# Patient Record
Sex: MALE | Race: WHITE | NOT HISPANIC OR LATINO | Employment: FULL TIME | URBAN - METROPOLITAN AREA
[De-identification: names, ages, dates, MRNs, and addresses within clinical notes are randomized per-mention and may not be internally consistent; named-entity substitution may affect disease eponyms.]

---

## 2019-04-24 ENCOUNTER — NURSE TRIAGE (OUTPATIENT)
Dept: NURSING | Facility: CLINIC | Age: 24
End: 2019-04-24

## 2019-04-24 ENCOUNTER — APPOINTMENT (OUTPATIENT)
Dept: ULTRASOUND IMAGING | Facility: CLINIC | Age: 24
End: 2019-04-24
Attending: EMERGENCY MEDICINE
Payer: COMMERCIAL

## 2019-04-24 ENCOUNTER — ANESTHESIA (OUTPATIENT)
Dept: SURGERY | Facility: CLINIC | Age: 24
End: 2019-04-24
Payer: COMMERCIAL

## 2019-04-24 ENCOUNTER — HOSPITAL ENCOUNTER (OUTPATIENT)
Facility: CLINIC | Age: 24
Discharge: HOME | End: 2019-04-25
Attending: EMERGENCY MEDICINE | Admitting: UROLOGY
Payer: COMMERCIAL

## 2019-04-24 ENCOUNTER — ANESTHESIA EVENT (OUTPATIENT)
Dept: SURGERY | Facility: CLINIC | Age: 24
End: 2019-04-24
Payer: COMMERCIAL

## 2019-04-24 ENCOUNTER — SURGERY (OUTPATIENT)
Age: 24
End: 2019-04-24
Payer: COMMERCIAL

## 2019-04-24 DIAGNOSIS — S31.30XA RUPTURE OF TESTIS, INITIAL ENCOUNTER: Primary | ICD-10-CM

## 2019-04-24 DIAGNOSIS — N50.1: ICD-10-CM

## 2019-04-24 DIAGNOSIS — S30.22XA CONTUSION OF TESTIS, INITIAL ENCOUNTER: ICD-10-CM

## 2019-04-24 LAB
ANION GAP SERPL CALCULATED.3IONS-SCNC: 8 MMOL/L (ref 3–14)
BUN SERPL-MCNC: 20 MG/DL (ref 7–30)
CALCIUM SERPL-MCNC: 9.3 MG/DL (ref 8.5–10.1)
CHLORIDE SERPL-SCNC: 103 MMOL/L (ref 94–109)
CO2 SERPL-SCNC: 28 MMOL/L (ref 20–32)
CREAT SERPL-MCNC: 0.96 MG/DL (ref 0.66–1.25)
ERYTHROCYTE [DISTWIDTH] IN BLOOD BY AUTOMATED COUNT: 12.9 % (ref 10–15)
GFR SERPL CREATININE-BSD FRML MDRD: >90 ML/MIN/{1.73_M2}
GLUCOSE SERPL-MCNC: 94 MG/DL (ref 70–99)
HCT VFR BLD AUTO: 43.3 % (ref 40–53)
HGB BLD-MCNC: 14.6 G/DL (ref 13.3–17.7)
MCH RBC QN AUTO: 28.9 PG (ref 26.5–33)
MCHC RBC AUTO-ENTMCNC: 33.7 G/DL (ref 31.5–36.5)
MCV RBC AUTO: 86 FL (ref 78–100)
PLATELET # BLD AUTO: 180 10E9/L (ref 150–450)
POTASSIUM SERPL-SCNC: 4.1 MMOL/L (ref 3.4–5.3)
RBC # BLD AUTO: 5.06 10E12/L (ref 4.4–5.9)
SODIUM SERPL-SCNC: 139 MMOL/L (ref 133–144)
WBC # BLD AUTO: 7.2 10E9/L (ref 4–11)

## 2019-04-24 PROCEDURE — 40000936 ZZH STATISTIC OUTPATIENT (NON-OBS) NIGHT

## 2019-04-24 PROCEDURE — 93976 VASCULAR STUDY: CPT

## 2019-04-24 PROCEDURE — 25000125 ZZHC RX 250: Performed by: NURSE ANESTHETIST, CERTIFIED REGISTERED

## 2019-04-24 PROCEDURE — 25800030 ZZH RX IP 258 OP 636: Performed by: ANESTHESIOLOGY

## 2019-04-24 PROCEDURE — 25000128 H RX IP 250 OP 636: Performed by: UROLOGY

## 2019-04-24 PROCEDURE — 25000128 H RX IP 250 OP 636: Performed by: NURSE ANESTHETIST, CERTIFIED REGISTERED

## 2019-04-24 PROCEDURE — 37000008 ZZH ANESTHESIA TECHNICAL FEE, 1ST 30 MIN: Performed by: UROLOGY

## 2019-04-24 PROCEDURE — 25000125 ZZHC RX 250: Performed by: UROLOGY

## 2019-04-24 PROCEDURE — 88307 TISSUE EXAM BY PATHOLOGIST: CPT | Performed by: UROLOGY

## 2019-04-24 PROCEDURE — 25000566 ZZH SEVOFLURANE, EA 15 MIN: Performed by: UROLOGY

## 2019-04-24 PROCEDURE — 25000132 ZZH RX MED GY IP 250 OP 250 PS 637: Performed by: EMERGENCY MEDICINE

## 2019-04-24 PROCEDURE — 71000012 ZZH RECOVERY PHASE 1 LEVEL 1 FIRST HR: Performed by: UROLOGY

## 2019-04-24 PROCEDURE — 27210794 ZZH OR GENERAL SUPPLY STERILE: Performed by: UROLOGY

## 2019-04-24 PROCEDURE — 36000050 ZZH SURGERY LEVEL 2 1ST 30 MIN: Performed by: UROLOGY

## 2019-04-24 PROCEDURE — 37000009 ZZH ANESTHESIA TECHNICAL FEE, EACH ADDTL 15 MIN: Performed by: UROLOGY

## 2019-04-24 PROCEDURE — 80048 BASIC METABOLIC PNL TOTAL CA: CPT | Performed by: ANESTHESIOLOGY

## 2019-04-24 PROCEDURE — 85027 COMPLETE CBC AUTOMATED: CPT | Performed by: ANESTHESIOLOGY

## 2019-04-24 PROCEDURE — 36000052 ZZH SURGERY LEVEL 2 EA 15 ADDTL MIN: Performed by: UROLOGY

## 2019-04-24 PROCEDURE — 99285 EMERGENCY DEPT VISIT HI MDM: CPT | Mod: 25

## 2019-04-24 PROCEDURE — 40000170 ZZH STATISTIC PRE-PROCEDURE ASSESSMENT II: Performed by: UROLOGY

## 2019-04-24 RX ORDER — CEPHALEXIN 500 MG/1
500 CAPSULE ORAL EVERY 6 HOURS
Status: DISCONTINUED | OUTPATIENT
Start: 2019-04-25 | End: 2019-04-25

## 2019-04-24 RX ORDER — PROPOFOL 10 MG/ML
INJECTION, EMULSION INTRAVENOUS CONTINUOUS PRN
Status: DISCONTINUED | OUTPATIENT
Start: 2019-04-24 | End: 2019-04-25

## 2019-04-24 RX ORDER — PROPOFOL 10 MG/ML
INJECTION, EMULSION INTRAVENOUS PRN
Status: DISCONTINUED | OUTPATIENT
Start: 2019-04-24 | End: 2019-04-25

## 2019-04-24 RX ORDER — LIDOCAINE HYDROCHLORIDE 20 MG/ML
INJECTION, SOLUTION INFILTRATION; PERINEURAL PRN
Status: DISCONTINUED | OUTPATIENT
Start: 2019-04-24 | End: 2019-04-25

## 2019-04-24 RX ORDER — NALOXONE HYDROCHLORIDE 0.4 MG/ML
.1-.4 INJECTION, SOLUTION INTRAMUSCULAR; INTRAVENOUS; SUBCUTANEOUS
Status: DISCONTINUED | OUTPATIENT
Start: 2019-04-24 | End: 2019-04-25 | Stop reason: HOSPADM

## 2019-04-24 RX ORDER — CEFAZOLIN SODIUM 2 G/100ML
2 INJECTION, SOLUTION INTRAVENOUS
Status: COMPLETED | OUTPATIENT
Start: 2019-04-24 | End: 2019-04-24

## 2019-04-24 RX ORDER — MAGNESIUM HYDROXIDE 1200 MG/15ML
LIQUID ORAL PRN
Status: DISCONTINUED | OUTPATIENT
Start: 2019-04-24 | End: 2019-04-25 | Stop reason: HOSPADM

## 2019-04-24 RX ORDER — DEXAMETHASONE SODIUM PHOSPHATE 4 MG/ML
INJECTION, SOLUTION INTRA-ARTICULAR; INTRALESIONAL; INTRAMUSCULAR; INTRAVENOUS; SOFT TISSUE PRN
Status: DISCONTINUED | OUTPATIENT
Start: 2019-04-24 | End: 2019-04-25

## 2019-04-24 RX ORDER — MORPHINE SULFATE 4 MG/ML
4 INJECTION, SOLUTION INTRAMUSCULAR; INTRAVENOUS
Status: DISCONTINUED | OUTPATIENT
Start: 2019-04-24 | End: 2019-04-24 | Stop reason: CLARIF

## 2019-04-24 RX ORDER — EPHEDRINE SULFATE 50 MG/ML
INJECTION, SOLUTION INTRAMUSCULAR; INTRAVENOUS; SUBCUTANEOUS PRN
Status: DISCONTINUED | OUTPATIENT
Start: 2019-04-24 | End: 2019-04-25

## 2019-04-24 RX ORDER — BUPIVACAINE HYDROCHLORIDE AND EPINEPHRINE 5; 5 MG/ML; UG/ML
INJECTION, SOLUTION PERINEURAL PRN
Status: DISCONTINUED | OUTPATIENT
Start: 2019-04-24 | End: 2019-04-25 | Stop reason: HOSPADM

## 2019-04-24 RX ORDER — FENTANYL CITRATE 50 UG/ML
INJECTION, SOLUTION INTRAMUSCULAR; INTRAVENOUS PRN
Status: DISCONTINUED | OUTPATIENT
Start: 2019-04-24 | End: 2019-04-25

## 2019-04-24 RX ORDER — GLYCOPYRROLATE 0.2 MG/ML
INJECTION, SOLUTION INTRAMUSCULAR; INTRAVENOUS PRN
Status: DISCONTINUED | OUTPATIENT
Start: 2019-04-24 | End: 2019-04-25

## 2019-04-24 RX ORDER — ONDANSETRON 4 MG/1
4 TABLET, ORALLY DISINTEGRATING ORAL EVERY 30 MIN PRN
Status: DISCONTINUED | OUTPATIENT
Start: 2019-04-24 | End: 2019-04-25 | Stop reason: HOSPADM

## 2019-04-24 RX ORDER — SODIUM CHLORIDE, SODIUM LACTATE, POTASSIUM CHLORIDE, CALCIUM CHLORIDE 600; 310; 30; 20 MG/100ML; MG/100ML; MG/100ML; MG/100ML
INJECTION, SOLUTION INTRAVENOUS CONTINUOUS
Status: DISCONTINUED | OUTPATIENT
Start: 2019-04-24 | End: 2019-04-25 | Stop reason: HOSPADM

## 2019-04-24 RX ORDER — ONDANSETRON 2 MG/ML
4 INJECTION INTRAMUSCULAR; INTRAVENOUS EVERY 30 MIN PRN
Status: DISCONTINUED | OUTPATIENT
Start: 2019-04-24 | End: 2019-04-25 | Stop reason: HOSPADM

## 2019-04-24 RX ORDER — IBUPROFEN 400 MG/1
800 TABLET, FILM COATED ORAL ONCE
Status: COMPLETED | OUTPATIENT
Start: 2019-04-24 | End: 2019-04-24

## 2019-04-24 RX ORDER — CEFAZOLIN SODIUM 1 G/3ML
1 INJECTION, POWDER, FOR SOLUTION INTRAMUSCULAR; INTRAVENOUS ONCE
Status: DISCONTINUED | OUTPATIENT
Start: 2019-04-24 | End: 2019-04-25

## 2019-04-24 RX ORDER — FENTANYL CITRATE 50 UG/ML
25-50 INJECTION, SOLUTION INTRAMUSCULAR; INTRAVENOUS
Status: DISCONTINUED | OUTPATIENT
Start: 2019-04-24 | End: 2019-04-25 | Stop reason: HOSPADM

## 2019-04-24 RX ORDER — HYDROCODONE BITARTRATE AND ACETAMINOPHEN 5; 325 MG/1; MG/1
1 TABLET ORAL EVERY 6 HOURS PRN
Status: DISCONTINUED | OUTPATIENT
Start: 2019-04-24 | End: 2019-04-25 | Stop reason: HOSPADM

## 2019-04-24 RX ORDER — ONDANSETRON 2 MG/ML
INJECTION INTRAMUSCULAR; INTRAVENOUS PRN
Status: DISCONTINUED | OUTPATIENT
Start: 2019-04-24 | End: 2019-04-25

## 2019-04-24 RX ORDER — SODIUM CHLORIDE 9 MG/ML
INJECTION, SOLUTION INTRAVENOUS CONTINUOUS
Status: DISCONTINUED | OUTPATIENT
Start: 2019-04-24 | End: 2019-04-25 | Stop reason: HOSPADM

## 2019-04-24 RX ADMIN — BUPIVACAINE HYDROCHLORIDE AND EPINEPHRINE BITARTRATE 15 ML: 5; .005 INJECTION, SOLUTION PERINEURAL at 23:48

## 2019-04-24 RX ADMIN — FENTANYL CITRATE 100 MCG: 50 INJECTION, SOLUTION INTRAMUSCULAR; INTRAVENOUS at 23:03

## 2019-04-24 RX ADMIN — PROPOFOL 200 MG: 10 INJECTION, EMULSION INTRAVENOUS at 23:04

## 2019-04-24 RX ADMIN — SODIUM CHLORIDE 1000 ML: 900 IRRIGANT IRRIGATION at 23:15

## 2019-04-24 RX ADMIN — Medication 10 MG: at 23:39

## 2019-04-24 RX ADMIN — IBUPROFEN 800 MG: 400 TABLET ORAL at 20:27

## 2019-04-24 RX ADMIN — DEXAMETHASONE SODIUM PHOSPHATE 4 MG: 4 INJECTION, SOLUTION INTRA-ARTICULAR; INTRALESIONAL; INTRAMUSCULAR; INTRAVENOUS; SOFT TISSUE at 23:08

## 2019-04-24 RX ADMIN — Medication 5 MG: at 23:13

## 2019-04-24 RX ADMIN — MIDAZOLAM 2 MG: 1 INJECTION INTRAMUSCULAR; INTRAVENOUS at 22:58

## 2019-04-24 RX ADMIN — SODIUM CHLORIDE, POTASSIUM CHLORIDE, SODIUM LACTATE AND CALCIUM CHLORIDE: 600; 310; 30; 20 INJECTION, SOLUTION INTRAVENOUS at 22:55

## 2019-04-24 RX ADMIN — Medication 5 MG: at 23:29

## 2019-04-24 RX ADMIN — LIDOCAINE HYDROCHLORIDE 60 MG: 20 INJECTION, SOLUTION INFILTRATION; PERINEURAL at 23:03

## 2019-04-24 RX ADMIN — Medication 5 MG: at 23:26

## 2019-04-24 RX ADMIN — SUCCINYLCHOLINE CHLORIDE 130 MG: 20 INJECTION, SOLUTION INTRAMUSCULAR; INTRAVENOUS; PARENTERAL at 23:04

## 2019-04-24 RX ADMIN — FENTANYL CITRATE 50 MCG: 50 INJECTION, SOLUTION INTRAMUSCULAR; INTRAVENOUS at 23:14

## 2019-04-24 RX ADMIN — PROPOFOL 30 MCG/KG/MIN: 10 INJECTION, EMULSION INTRAVENOUS at 23:15

## 2019-04-24 RX ADMIN — CEFAZOLIN SODIUM 2 G: 2 INJECTION, SOLUTION INTRAVENOUS at 23:10

## 2019-04-24 RX ADMIN — Medication 10 MG: at 23:11

## 2019-04-24 RX ADMIN — GLYCOPYRROLATE 0.1 MG: 0.2 INJECTION, SOLUTION INTRAMUSCULAR; INTRAVENOUS at 23:30

## 2019-04-24 RX ADMIN — GLYCOPYRROLATE 0.1 MG: 0.2 INJECTION, SOLUTION INTRAMUSCULAR; INTRAVENOUS at 23:39

## 2019-04-24 RX ADMIN — SODIUM CHLORIDE, POTASSIUM CHLORIDE, SODIUM LACTATE AND CALCIUM CHLORIDE: 600; 310; 30; 20 INJECTION, SOLUTION INTRAVENOUS at 23:43

## 2019-04-24 RX ADMIN — ONDANSETRON 4 MG: 2 INJECTION INTRAMUSCULAR; INTRAVENOUS at 23:27

## 2019-04-24 SDOH — HEALTH STABILITY: MENTAL HEALTH: HOW OFTEN DO YOU HAVE A DRINK CONTAINING ALCOHOL?: NEVER

## 2019-04-24 ASSESSMENT — ENCOUNTER SYMPTOMS
HEMATURIA: 0
VOMITING: 0
NAUSEA: 0
FREQUENCY: 0
ABDOMINAL PAIN: 0
DIFFICULTY URINATING: 0
DYSURIA: 0

## 2019-04-24 ASSESSMENT — MIFFLIN-ST. JEOR: SCORE: 1706.14

## 2019-04-25 VITALS
RESPIRATION RATE: 15 BRPM | BODY MASS INDEX: 24.44 KG/M2 | HEIGHT: 69 IN | WEIGHT: 165 LBS | OXYGEN SATURATION: 97 % | HEART RATE: 61 BPM | SYSTOLIC BLOOD PRESSURE: 100 MMHG | TEMPERATURE: 96.6 F | DIASTOLIC BLOOD PRESSURE: 46 MMHG

## 2019-04-25 PROBLEM — S31.30XA: Status: ACTIVE | Noted: 2019-04-25

## 2019-04-25 LAB — GLUCOSE BLDC GLUCOMTR-MCNC: 104 MG/DL (ref 70–99)

## 2019-04-25 PROCEDURE — 82962 GLUCOSE BLOOD TEST: CPT

## 2019-04-25 PROCEDURE — 25000125 ZZHC RX 250: Performed by: UROLOGY

## 2019-04-25 PROCEDURE — 40000934 ZZH STATISTIC OUTPATIENT (NON-OBS) DAY

## 2019-04-25 PROCEDURE — 25800030 ZZH RX IP 258 OP 636: Performed by: EMERGENCY MEDICINE

## 2019-04-25 PROCEDURE — 25000132 ZZH RX MED GY IP 250 OP 250 PS 637: Performed by: UROLOGY

## 2019-04-25 RX ORDER — NALOXONE HYDROCHLORIDE 0.4 MG/ML
.1-.4 INJECTION, SOLUTION INTRAMUSCULAR; INTRAVENOUS; SUBCUTANEOUS
Status: DISCONTINUED | OUTPATIENT
Start: 2019-04-25 | End: 2019-04-25

## 2019-04-25 RX ORDER — ONDANSETRON 2 MG/ML
4 INJECTION INTRAMUSCULAR; INTRAVENOUS EVERY 30 MIN PRN
Status: DISCONTINUED | OUTPATIENT
Start: 2019-04-25 | End: 2019-04-25 | Stop reason: HOSPADM

## 2019-04-25 RX ORDER — CEPHALEXIN 500 MG/1
500 CAPSULE ORAL EVERY 8 HOURS
Status: DISCONTINUED | OUTPATIENT
Start: 2019-04-25 | End: 2019-04-25 | Stop reason: HOSPADM

## 2019-04-25 RX ORDER — SODIUM CHLORIDE, SODIUM LACTATE, POTASSIUM CHLORIDE, CALCIUM CHLORIDE 600; 310; 30; 20 MG/100ML; MG/100ML; MG/100ML; MG/100ML
INJECTION, SOLUTION INTRAVENOUS CONTINUOUS
Status: DISCONTINUED | OUTPATIENT
Start: 2019-04-25 | End: 2019-04-25 | Stop reason: HOSPADM

## 2019-04-25 RX ORDER — CLONIDINE HYDROCHLORIDE 0.1 MG/1
0.1 TABLET ORAL 2 TIMES DAILY
Status: DISCONTINUED | OUTPATIENT
Start: 2019-04-25 | End: 2019-04-25 | Stop reason: HOSPADM

## 2019-04-25 RX ORDER — ONDANSETRON 4 MG/1
4 TABLET, ORALLY DISINTEGRATING ORAL EVERY 30 MIN PRN
Status: DISCONTINUED | OUTPATIENT
Start: 2019-04-25 | End: 2019-04-25 | Stop reason: HOSPADM

## 2019-04-25 RX ORDER — ONDANSETRON 2 MG/ML
4 INJECTION INTRAMUSCULAR; INTRAVENOUS EVERY 6 HOURS PRN
Status: DISCONTINUED | OUTPATIENT
Start: 2019-04-25 | End: 2019-04-25 | Stop reason: HOSPADM

## 2019-04-25 RX ORDER — CEPHALEXIN 500 MG/1
500 CAPSULE ORAL EVERY 8 HOURS
Qty: 9 CAPSULE | Refills: 0 | Status: SHIPPED | OUTPATIENT
Start: 2019-04-25 | End: 2019-04-28

## 2019-04-25 RX ORDER — BACITRACIN ZINC 500 [USP'U]/G
OINTMENT TOPICAL PRN
Status: DISCONTINUED | OUTPATIENT
Start: 2019-04-25 | End: 2019-04-25 | Stop reason: HOSPADM

## 2019-04-25 RX ORDER — FENTANYL CITRATE 50 UG/ML
25-50 INJECTION, SOLUTION INTRAMUSCULAR; INTRAVENOUS
Status: DISCONTINUED | OUTPATIENT
Start: 2019-04-25 | End: 2019-04-25 | Stop reason: HOSPADM

## 2019-04-25 RX ORDER — ONDANSETRON 4 MG/1
4 TABLET, ORALLY DISINTEGRATING ORAL EVERY 6 HOURS PRN
Status: DISCONTINUED | OUTPATIENT
Start: 2019-04-25 | End: 2019-04-25 | Stop reason: HOSPADM

## 2019-04-25 RX ORDER — HYDROCODONE BITARTRATE AND ACETAMINOPHEN 5; 325 MG/1; MG/1
1-2 TABLET ORAL EVERY 4 HOURS PRN
Qty: 20 TABLET | Refills: 0 | Status: SHIPPED | OUTPATIENT
Start: 2019-04-25 | End: 2019-05-15

## 2019-04-25 RX ORDER — HYDROMORPHONE HYDROCHLORIDE 1 MG/ML
0.2 INJECTION, SOLUTION INTRAMUSCULAR; INTRAVENOUS; SUBCUTANEOUS
Status: DISCONTINUED | OUTPATIENT
Start: 2019-04-25 | End: 2019-04-25 | Stop reason: HOSPADM

## 2019-04-25 RX ORDER — LIDOCAINE 40 MG/G
CREAM TOPICAL
Status: DISCONTINUED | OUTPATIENT
Start: 2019-04-25 | End: 2019-04-25 | Stop reason: HOSPADM

## 2019-04-25 RX ORDER — HYDROMORPHONE HYDROCHLORIDE 1 MG/ML
.3-.5 INJECTION, SOLUTION INTRAMUSCULAR; INTRAVENOUS; SUBCUTANEOUS EVERY 5 MIN PRN
Status: DISCONTINUED | OUTPATIENT
Start: 2019-04-25 | End: 2019-04-25 | Stop reason: HOSPADM

## 2019-04-25 RX ADMIN — HYDROCODONE BITARTRATE AND ACETAMINOPHEN 1 TABLET: 5; 325 TABLET ORAL at 08:19

## 2019-04-25 RX ADMIN — SODIUM CHLORIDE: 9 INJECTION, SOLUTION INTRAVENOUS at 03:04

## 2019-04-25 RX ADMIN — CEPHALEXIN 500 MG: 500 CAPSULE ORAL at 08:19

## 2019-04-25 RX ADMIN — BACITRACIN ZINC 14 G: 500 OINTMENT TOPICAL at 00:10

## 2019-04-25 ASSESSMENT — MIFFLIN-ST. JEOR: SCORE: 1728.82

## 2019-04-25 NOTE — OR NURSING
Telephone report was given to [facility] RN.  Patient will be transported to Room. 17 via cart accompanied by RN.  Belongings: 1 hospital bag. Family : in WR

## 2019-04-25 NOTE — PLAN OF CARE
A&Ox4. VSS on RA. Up independently. Voiding adequately w/o difficulty. Pain controlled w/ Norco. Site cleansed w/ saline, dressing changed, jock bra on. Tammie reg diet. Discharge instructions and medications reviewed, pt and mother verbalize no further questions/concerns. Pt to follow up w/ Joe's office 5/10, pt aware. Pt to discharge home w/ mother
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
## 2019-04-25 NOTE — PHARMACY-ADMISSION MEDICATION HISTORY
Admission medication history interview status for the 4/24/2019  admission is complete. See EPIC admission navigator for prior to admission medications     Medication history source reliability:Good    Actions taken by pharmacist (provider contacted, etc): Left sticky note informing MD patient no longer takes Clonidine. Refused dose this am.     Additional medication history information not noted on PTA med list : Patient uses some topical medications for acne.    Medication reconciliation/reorder completed by provider prior to medication history? Yes    Time spent in this activity: 10 minutes    Prior to Admission medications    Medication Sig Last Dose Taking? Auth Provider   cephALEXin (KEFLEX) 500 MG capsule Take 1 capsule (500 mg) by mouth every 8 hours for 3 days  Yes Maya Trevino PA-C   HYDROcodone-acetaminophen (NORCO) 5-325 MG tablet Take 1-2 tablets by mouth every 4 hours as needed (Moderate to Severe Pain)  Yes Armani Diaz MD
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
## 2019-04-25 NOTE — PROVIDER NOTIFICATION
MD Notification    Notified Person: MD    Notified Person Name: Joe    Notification Date/Time: 1300    Notification Interaction: telephone     Purpose of Notification: Pt has met all post op goals. Is he ok to discharge?    Orders Received: Yes, pt ok to discharge. Please review discharge instructions w/ pt carefully regarding lifting    Comments:

## 2019-04-25 NOTE — PROVIDER NOTIFICATION
MD Notification    Notified Person: MD    Notified Person Name: Dr. Diaz    Notification Date/Time: 4/25/19 1410    Notification Interaction: Web page    Purpose of Notification: Patient to discharge - will they need to be seen before they can leave.     Orders Received:    Comments:

## 2019-04-25 NOTE — ANESTHESIA CARE TRANSFER NOTE
Patient: Harry eGorge    Procedure(s):  SCROTAL EXPLORATION, ORCHIOPEXY, AND REPAIR OF TESTICULAR FRACTURE  Orchiopexy    Diagnosis: unknown  Diagnosis Additional Information: No value filed.    Anesthesia Type:   No value filed.     Note:  Airway :Face Mask  Patient transferred to:PACU  Comments: Neuromuscular blockade not used after succinylcholine for intubation, spontaneous return of TOF 4/4 with sustained tetany, spontaneous respirations, adequate tidal volumes, followed commands to voice, oropharynx suctioned with soft flexible catheter, extubated atraumatically, extubated with suction, airway patent after extubation.  Oxygen via facemask at 10 liters per minute to PACU. Oxygen tubing connected to wall O2 in PACU, SpO2, NiBP, and EKG monitors and alarms on and functioning, Maco Hugger warmer connected to patient gown, report on patient's clinical status given to PACU RN, RN questions answered. Handoff Report: Identifed the Patient, Identified the Reponsible Provider, Reviewed the pertinent medical history, Discussed the surgical course, Reviewed Intra-OP anesthesia mangement and issues during anesthesia, Set expectations for post-procedure period and Allowed opportunity for questions and acknowledgement of understanding      Vitals: (Last set prior to Anesthesia Care Transfer)    CRNA VITALS  4/24/2019 2336 - 4/25/2019 0010      4/25/2019             NIBP:  121/59    Pulse:  92    NIBP Mean:  87    Ht Rate:  92    SpO2:  97 %    Resp Rate (observed):  4  (Abnormal)     Resp Rate (set):  10                Electronically Signed By: SINDY Calix CRNA  April 25, 2019  12:10 AM

## 2019-04-25 NOTE — CONSULTS
"UROLOGY CONSULTATION:    PATIENT: Harry SCHERER (1995)  AGE: 23 year old year old male    Primary Care Provider / Referring Physician:  Clinic, [facility]    CHIEF COMPLAINT:  Contusion of testis, initial encounter  Hemorrhage of testis    HPI:   nEW ONSET OF right testicular trauma with wrestling around 6 pm tonight; no nause, no vomiting, no voiding symptoms but he has a lot of scrotal swelling.  Scrotal US suggested testicular fracture.    Past Medical History:   Diagnosis Date     Autism      Pain in joint, ankle and foot 10/13/2008     History reviewed. No pertinent surgical history.  PAST SOCIAL HISTORY  Social History     Social History Narrative     Not on file     Social History     Tobacco Use     Smoking status: Never Smoker     Smokeless tobacco: Never Used   Substance Use Topics     Alcohol use: Never     Frequency: Never     Review of Systems  @Abcamos@             No current outpatient medications on file.              No Known Allergies    PHYSICAL EXAM:          /58   Pulse 61   Temp 98.2  F (36.8  C) (Oral)   Resp 18   Ht 1.753 m (5' 9\")   Wt 72.6 kg (160 lb)   SpO2 100%   BMI 23.63 kg/m           General appearance shows no deformaties and good grooming.  Chest: clear  Abdomen: soft, no masses  : right testicle is tender; difficult to assess the edges; cannot rule out lineal deformity, tender to palpation; twice the size of the normal size        LABS:   No results found for: PSA  UA RESULTS:  No results for input(s): COLOR, APPEARANCE, URINEGLC, URINEBILI, URINEKETONE, SG, UBLD, URINEPH, PROTEIN, UROBILINOGEN, NITRITE, LEUKEST, RBCU, WBCU in the last 32675 hours.  No results found for: CR]  No results found for: HGB]      Right Scrotum:       Testis: Right testis is slightly lobulated in contour, there is  intratesticular flow which is decreased at the upper pole and normal  in appearance in the lower pole. There are complex echoes in the " right  scrotal sac suspicious for complex fluid or blood. There is a linear  area of decreased echogenicity in the posterior mid testis raising the  question of a partial laceration of the right testis.       Epididymis: 1.5 cm epididymal head cyst.       No varicocele.     Left Scrotum       Testis: Normal, 4.7 x 2.5 x 2.1 cm.       Epididymis: Normal.        Small left hydrocele. No varicocele.                                                                       IMPRESSION: Complex fluid in the right scrotal sac consistent with  hemorrhage. There is concern for a tiny possible laceration in the  right testicle and upper pole right testis is slightly enlarged,  hypoechoic with decreased blood flow. Results discussed by myself with  Dr. Levy at the time of the exam.      ASSESSMENT:   1. Contusion of testis, initial encounter    2. Hemorrhage of testis          PLAN:    Concern for testicular fracture with hemorrhage into the testicle.   I consulted for scrotal exploration with risk of orchiectomy/decreased fertility/scarring and atrophy  All questions were answered.     Armani Diaz

## 2019-04-25 NOTE — OR NURSING
Patient Harry George is in stable condition and has met criteria for PACU discharge.  MDA Buna was  informed and a sign out was given for Unit/Station transfer.

## 2019-04-25 NOTE — ANESTHESIA POSTPROCEDURE EVALUATION
Patient: Harry George    Procedure(s):  SCROTAL EXPLORATION, ORCHIOPEXY, AND REPAIR OF TESTICULAR FRACTURE  Orchiopexy    Diagnosis:unknown  Diagnosis Additional Information: No value filed.    Anesthesia Type:  General, RSI, ETT    Note:  Anesthesia Post Evaluation    Patient location during evaluation: PACU  Patient participation: Able to fully participate in evaluation  Level of consciousness: awake  Pain management: adequate  Airway patency: patent  Cardiovascular status: acceptable  Respiratory status: acceptable  Hydration status: acceptable  PONV: none     Anesthetic complications: None          Last vitals:  Vitals:    04/24/19 2222 04/25/19 0010 04/25/19 0020   BP: 126/58 115/65 117/60   Pulse: 61 57 56   Resp: 18 17 18   Temp:  36.5  C (97.7  F) 36.5  C (97.7  F)   SpO2: 100% 100% 98%         Electronically Signed By: Parminder Yates MD  April 25, 2019  12:29 AM

## 2019-04-25 NOTE — PLAN OF CARE
Pt is admission of the shift, admitted from PACU following repair of testicular fracture. He is A&OX4, VSS on RA, denies pain. Dressing C/D/I, scrotum support in place.Up with SBA, voiding o.k(1300cc this morning at once).IVF Nacl cont@200.Family in room.

## 2019-04-25 NOTE — ANESTHESIA PREPROCEDURE EVALUATION
Anesthesia Pre-Procedure Evaluation    Patient: Harry George   MRN: [id number] : 1995          Preoperative Diagnosis: unknown    Procedure(s):  SCROTAL EXPLORATION, ORCHIOPEXY, AND REPAIR OF TESTICULAR FRACTURE  Orchiopexy    Past Medical History:   Diagnosis Date     Autism      Pain in joint, ankle and foot 10/13/2008     History reviewed. No pertinent surgical history.    Anesthesia Evaluation     . Pt has had prior anesthetic.     No history of anesthetic complications          ROS/MED HX    ENT/Pulmonary:  - neg pulmonary ROS     Neurologic:       Cardiovascular:  - neg cardiovascular ROS       METS/Exercise Tolerance:  >4 METS   Hematologic:         Musculoskeletal:         GI/Hepatic:  - neg GI/hepatic ROS       Renal/Genitourinary: Comment: Acute testicular rupture vs torsion        Endo:         Psychiatric:     (+) psychiatric history Psychiatric Hx List: ADHD.      Infectious Disease:         Malignancy:         Other: Comment: Autism                         Physical Exam  Normal systems: dental    Airway   Mallampati: I  TM distance: >3 FB  Neck ROM: full    Dental     Cardiovascular   Rhythm and rate: regular and normal      Pulmonary    breath sounds clear to auscultation            Lab Results   Component Value Date    WBC 7.2 2019    HGB 14.6 2019    HCT 43.3 2019     2019     2019    POTASSIUM 4.1 2019    CHLORIDE 103 2019    CO2 28 2019    BUN 20 2019    CR 0.96 2019    GLC 94 2019    ANASTASIYA 9.3 2019       Preop Vitals  BP Readings from Last 3 Encounters:   19 117/60   11 97/54 (3 %/ 9 %)*     *BP percentiles are based on the 2017 AAP Clinical Practice Guideline for boys    Pulse Readings from Last 3 Encounters:   19 56      Resp Readings from Last 3 Encounters:   19 18   11 16    SpO2 Readings from Last 3 Encounters:   19 98%   11 96%      Temp Readings  "from Last 1 Encounters:   04/25/19 36.5  C (97.7  F)    Ht Readings from Last 1 Encounters:   04/24/19 1.753 m (5' 9\")      Wt Readings from Last 1 Encounters:   04/24/19 72.6 kg (160 lb)    Estimated body mass index is 23.63 kg/m  as calculated from the following:    Height as of this encounter: 1.753 m (5' 9\").    Weight as of this encounter: 72.6 kg (160 lb).       Anesthesia Plan      History & Physical Review  History and physical reviewed and following examination; no interval change.    ASA Status:  2 .    NPO Status:  Full stomach and waived due to emergency    Plan for General, RSI and ETT with Intravenous and Propofol induction. Maintenance will be Balanced.    PONV prophylaxis:  Ondansetron (or other 5HT-3) and Dexamethasone or Solumedrol       Postoperative Care  Postoperative pain management:  IV analgesics and Oral pain medications.      Consents  Anesthetic plan, risks, benefits and alternatives discussed with:  Patient and Parent (Mother and/or Father).  Use of blood products discussed: Yes.   Use of blood products discussed with Patient.  Consented to blood products.  .                 Parminder Yates MD  "

## 2019-04-25 NOTE — BRIEF OP NOTE
[facility] Brief Operative Note    Pre-operative diagnosis: Testicular injury   Post-operative diagnosis Testicular fracture    Procedure: Procedure(s):  SCROTAL EXPLORATION, ORCHIOPEXY, AND REPAIR OF TESTICULAR FRACTURE  Orchiopexy   Surgeon: Armani Diaz MD   Assistants(s): none   Estimated blood loss: Less than 10 ml    Specimens: Testicular tissue with hematoma   Findings: Fractured testicle 3 cm in size; hematoma

## 2019-04-26 LAB — COPATH REPORT: NORMAL
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
## 2022-03-31 ENCOUNTER — HOSPITAL ENCOUNTER (EMERGENCY)
Facility: CLINIC | Age: 27
Discharge: HOME | End: 2022-03-31
Attending: PHYSICIAN ASSISTANT | Admitting: PHYSICIAN ASSISTANT
Payer: COMMERCIAL

## 2022-03-31 VITALS
RESPIRATION RATE: 18 BRPM | OXYGEN SATURATION: 99 % | DIASTOLIC BLOOD PRESSURE: 48 MMHG | WEIGHT: 165 LBS | HEIGHT: 69 IN | SYSTOLIC BLOOD PRESSURE: 136 MMHG | TEMPERATURE: 98.7 F | HEART RATE: 61 BPM | BODY MASS INDEX: 24.44 KG/M2

## 2022-03-31 DIAGNOSIS — S01.81XA FACIAL LACERATION, INITIAL ENCOUNTER: ICD-10-CM
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
## 2022-03-31 PROCEDURE — 90471 IMMUNIZATION ADMIN: CPT | Performed by: PHYSICIAN ASSISTANT

## 2022-03-31 PROCEDURE — 12011 RPR F/E/E/N/L/M 2.5 CM/<: CPT

## 2022-03-31 PROCEDURE — 99283 EMERGENCY DEPT VISIT LOW MDM: CPT | Mod: 25

## 2022-03-31 PROCEDURE — 250N000011 HC RX IP 250 OP 636: Performed by: PHYSICIAN ASSISTANT

## 2022-03-31 PROCEDURE — 250N000009 HC RX 250

## 2022-03-31 PROCEDURE — 90715 TDAP VACCINE 7 YRS/> IM: CPT | Performed by: PHYSICIAN ASSISTANT

## 2022-03-31 RX ADMIN — CLOSTRIDIUM TETANI TOXOID ANTIGEN (FORMALDEHYDE INACTIVATED), CORYNEBACTERIUM DIPHTHERIAE TOXOID ANTIGEN (FORMALDEHYDE INACTIVATED), BORDETELLA PERTUSSIS TOXOID ANTIGEN (GLUTARALDEHYDE INACTIVATED), BORDETELLA PERTUSSIS FILAMENTOUS HEMAGGLUTININ ANTIGEN (FORMALDEHYDE INACTIVATED), BORDETELLA PERTUSSIS PERTACTIN ANTIGEN, AND BORDETELLA PERTUSSIS FIMBRIAE 2/3 ANTIGEN 0.5 ML: 5; 2; 2.5; 5; 3; 5 INJECTION, SUSPENSION INTRAMUSCULAR at 21:32

## 2022-03-31 RX ADMIN — Medication 3 ML: at 21:19

## 2022-03-31 ASSESSMENT — ENCOUNTER SYMPTOMS: WOUND: 1

## 2022-04-01 NOTE — ED TRIAGE NOTES
Pt was wrestling caught a blow to face, has a laceration under brow and above L eye. Denies any visual changes.    [Initial Consult] : an initial consult for [Morbid Obesity (BMI>40)] : morbid obesity (bmi>40)

## 2022-04-01 NOTE — ED PROVIDER NOTES
"  History     Chief Complaint:  Laceration       HPI   Harry DUBOIS Coujaylan is a 26 year old male who presents to the emergency department for evaluation of a laceration.  Patient reports that he was wrestling tonight around 1900 when he took an elbow or hand to the face and sustained a laceration just inferior to his left eyebrow.  He denies any loss of consciousness.  Bleeding ceased quite quickly.  He denies any other symptoms at this time.  No vision changes, eye pain, headache. No nausea or vomiting.    ROS:  Review of Systems   Skin: Positive for wound.   All other systems reviewed and are negative.      Allergies:  No Known Allergies     Medications:    No medications    Past Medical History:    Past Medical History:   Diagnosis Date     Autism      Pain in joint, ankle and foot 10/13/2008     Patient Active Problem List   Diagnosis     Ankle sprain     Pain in joint, ankle and foot     Knee pain     Other postprocedural status(V45.89)     Testicular fracture        Past Surgical History:    Past Surgical History:   Procedure Laterality Date     EXPLORE TESTIS Right 4/24/2019    Procedure: SCROTAL EXPLORATION, ORCHIOPEXY, AND REPAIR OF TESTICULAR FRACTURE;  Surgeon: Armani Diaz MD;  Location:  OR     ORCHIOPEXY Right 4/24/2019    Procedure: Orchiopexy;  Surgeon: Armani Diaz MD;  Location:  OR      Social History:   reports that he has never smoked. He has never used smokeless tobacco. He reports that he does not drink alcohol and does not use drugs.    PCP: [facility]     Physical Exam     Patient Vitals for the past 24 hrs:   BP Temp Temp src Pulse Resp SpO2 Height Weight   03/31/22 2106 136/48 98.7  F (37.1  C) Oral 61 18 99 % 1.753 m (5' 9\") 74.8 kg (165 lb)        Physical Exam  Constitutional: Pleasant. Cooperative.   Eyes: Pupils equally round and reactive  HENT: 1.6cm laceration just inferior to left eyebrow. Full ROM of eyelid and eyebrow. EOMI. Oropharynx " is normal with moist mucus membranes.  Cardiovascular: Regular rate and rhythm and without murmurs.  Respiratory: Normal respiratory effort, lungs are clear bilaterally.  Musculoskeletal: No asymmetry of the lower extremities.  Skin: Normal, without rash.  Neurologic: Cranial nerves grossly intact, normal cognition, no focal deficits. Alert and oriented x 3.   Psychiatric: Normal affect.  Nursing notes and vital signs reviewed.      Emergency Department Course     Procedures     Narrative: Procedure: Laceration Repair        LACERATION:  A simple clean 1.6 cm laceration.      LOCATION:  Inferior to left eyebrow      ANESTHESIA:  LET - Topical      PREPARATION:  Irrigation and Scrubbing with Normal Saline and Shur Clens      DEBRIDEMENT:  no debridement      CLOSURE:  Wound was closed with One Layer.  Skin closed with 5 x 6.0 Ethylon using interrupted sutures.    Emergency Department Course:    Reviewed:  I reviewed nursing notes, vitals, past medical history and Care Everywhere    Assessments:   I obtained history and examined the patient as noted above.     Interventions:  Medications   lido-EPINEPHrine-tetracaine (LET) topical gel GEL (3 mLs Topical Given 3/31/22 2119)   Tdap (tetanus-diphtheria-acell pertussis) (ADACEL) injection 0.5 mL (0.5 mLs Intramuscular Given 3/31/22 2132)        Disposition:  The patient was discharged to home.     Impression & Plan      Medical Decision Making:  Harry George is a 26 year old male who presents to ED for evaluation of facial laceration.  See HPI as above for additional details.  Vitals and physical exam as above.  No indication for head CT at this point in time based upon mechanism.  No indication for other imaging at this time based upon history and physical exam.  Wound was repaired as above.  Sutures need to be removed in 5 days via primary care.  Tetanus updated.  [facility] patient was safe for discharge to home. Discussed reasons to return. All questions answered.  Patient discharged to home in stable condition.    Diagnosis:    ICD-10-CM    1. Facial laceration, initial encounter  S01.81XA    2. Injury while wrestling  Y93.72         Discharge Medications:  New Prescriptions    No medications on file        3/31/2022   Yoav Whyte PA-C     This record was created at least in part using electronic voice recognition software, so please excuse any typographical errors.       Yoav Whyte PA-C  03/31/22 0756

## 2023-01-09 ENCOUNTER — HOSPITAL ENCOUNTER (EMERGENCY)
Facility: CLINIC | Age: 28
Discharge: HOME | End: 2023-01-09
Attending: EMERGENCY MEDICINE | Admitting: EMERGENCY MEDICINE
Payer: OTHER MISCELLANEOUS

## 2023-01-09 VITALS
WEIGHT: 165 LBS | SYSTOLIC BLOOD PRESSURE: 132 MMHG | RESPIRATION RATE: 20 BRPM | BODY MASS INDEX: 24.44 KG/M2 | OXYGEN SATURATION: 100 % | DIASTOLIC BLOOD PRESSURE: 83 MMHG | TEMPERATURE: 98.1 F | HEART RATE: 68 BPM | HEIGHT: 69 IN

## 2023-01-09 DIAGNOSIS — S01.81XA LACERATION OF FOREHEAD, INITIAL ENCOUNTER: ICD-10-CM

## 2023-01-09 PROCEDURE — 99282 EMERGENCY DEPT VISIT SF MDM: CPT

## 2023-01-09 PROCEDURE — 12013 RPR F/E/E/N/L/M 2.6-5.0 CM: CPT

## 2023-01-09 ASSESSMENT — ENCOUNTER SYMPTOMS
NECK PAIN: 0
WOUND: 1
NAUSEA: 0
VOMITING: 0

## 2023-01-10 NOTE — ED PROVIDER NOTES
"    History     Chief Complaint:  Laceration       HPI   Harry DUBOIS Cousins is a 27 year old male who presents with a laceration. The patient states that he was at wrestling practice and he took an elbow or knee to the left eyebrow. He applied a compress directly after because it began to bleed right away. He never lost consciousness. He denies any headache, vision changes, neck pain, nausea, or vomiting.  Tetanus is up-to-date.      Independent Historian: None     Review of External Notes: None     ROS:  Review of Systems   Eyes: Negative for visual disturbance.   Gastrointestinal: Negative for nausea and vomiting.   Musculoskeletal: Negative for neck pain.   Skin: Positive for wound.   All other systems reviewed and are negative.    Allergies:  Ibuprofen     Medications:    The patient is currently on no regular medications.    Past Medical History:    Autism  ADHD    Past Surgical History:    Orchiopexy    Knee Arthroscopy    Social History:  Patient reports that he has never smoked. He has never used smokeless tobacco. He reports that he does not drink alcohol and does not use drugs.  PCP[facility]     Physical Exam     Patient Vitals for the past 24 hrs:   BP Temp Pulse Resp SpO2 Height Weight   01/09/23 1919 132/83 98.1  F (36.7  C) 68 20 100 % 1.753 m (5' 9\") 74.8 kg (165 lb)        Physical Exam  Nursing note and vitals reviewed.  Constitutional:  Oriented to person, place, and time. Cooperative.   HENT:   Nose:    Nose normal.   Mouth/Throat:   Mucous membranes are normal.   Eyes:    Conjunctivae normal and EOM are normal.      Pupils are equal, round, and reactive to light.   Neck:    Trachea normal.   Cardiovascular:  Normal rate, regular rhythm, normal heart sounds and normal pulses. No murmur heard.  Pulmonary/Chest:  Effort normal and breath sounds normal.   Abdominal:   Soft. Normal appearance and bowel sounds are normal.      There is no tenderness.      There is no rebound " and no CVA tenderness.   Musculoskeletal:  Extremities atraumatic x 4.   Lymphadenopathy:  No cervical adenopathy.   Neurological:   Alert and oriented to person, place, and time. Normal strength.      No cranial nerve deficit or sensory deficit. GCS eye subscore is 4. GCS verbal subscore is 5. GCS motor subscore is 6.   Skin:    3.6 centimeter laceration just above the left eyebrow.  No bony tenderness to palpation in the orbital region on the left.  Psychiatric:   Normal mood and affect.    Emergency Department Course     Procedures     Laceration Repair      Procedure: Laceration Repair    Indication: Laceration    Consent: Verbal    Location:  Forehead    Length: 3.6 cm    Preparation: Irrigation with Sterile Saline.    Anesthesia/Sedation: Lidocaine - 1%      Treatment/Exploration: Wound explored, no foreign bodies found     Closure: The wound was closed with one layer. Skin/superficial layer was closed with 9 x 6-0 Nylon using Interrupted sutures.     Patient Status: The patient tolerated the procedure well: Yes. There were no complications.      Emergency Department Course & Assessments:    Consultations/Discussion of Management or Tests:  ED Course as of 01/09/23 2029 Mon Jan 09, 2023 1944 Obtained the patients history and performed initial exam     2008 Did laceration repair     Disposition:  The patient was discharged to home.     Impression & Plan      Medical Decision Making:  This is a 27-year-old male who came in for further evaluation of a laceration just above his left eyebrow.  I do not believe that he needs any imaging such as a CT scan of his facial bones or his head, as I do not believe that he has an intracranial hemorrhage or fracture.  His tetanus is up-to-date.  I subsequently repaired the laceration per the above procedure note.  His sutures will need to be removed in about 5 to 6 days.  He should return here with any concerns or worsening symptoms as well.    Diagnosis:    ICD-10-CM     1. 3.6 cm Laceration of forehead, initial encounter  S01.81XA     9 sutures         Scribe Disclosure:  I, Slim Moy, am serving as a scribe at 7:19 PM on 1/9/2023 to document services personally performed by Vikas Chatman MD based on my observations and the provider's statements to me.     1/9/2023   Vikas Chatman MD Lashkowitz, Seth H, MD  01/09/23 3411

## 2023-01-10 NOTE — ED TRIAGE NOTES
Pt is a , was sparing with a player, hit in forehead, lac above lt eye, no loc, no blood thinners     Triage Assessment     Row Name 01/09/23 1921       Triage Assessment (Adult)    Airway WDL WDL       Respiratory WDL    Respiratory WDL WDL       Cardiac WDL    Cardiac WDL WDL       Cognitive/Neuro/Behavioral WDL    Cognitive/Neuro/Behavioral WDL WDL

## (undated) DEVICE — PREP SKIN SCRUB TRAY 4461A

## (undated) DEVICE — PACK MINOR SBA15MIFSE

## (undated) DEVICE — SU VICRYL 2-0 CT-2 27" J333H

## (undated) DEVICE — ESU CLEANER TIP 31142717

## (undated) DEVICE — LINEN TOWEL PACK X5 5464

## (undated) DEVICE — SYR BULB IRRIG 50ML LATEX FREE 0035280

## (undated) DEVICE — BLADE CLIPPER SGL USE 9680

## (undated) DEVICE — DRAPE MINOR PROCEDURE LAP 29496

## (undated) DEVICE — SOL WATER IRRIG 1000ML BOTTLE 2F7114

## (undated) DEVICE — TUBING SUCTION 12"X1/4" N612

## (undated) DEVICE — SU CHROMIC 3-0 RB-1 27" U204H

## (undated) DEVICE — SU PDS II 4-0 RB-1 27" Z304H

## (undated) DEVICE — DRSG KERLIX FLUFFS X5

## (undated) DEVICE — SPONGE KITTNER 31001010

## (undated) RX ORDER — PROPOFOL 10 MG/ML
INJECTION, EMULSION INTRAVENOUS
Status: DISPENSED
Start: 2019-04-24

## (undated) RX ORDER — FENTANYL CITRATE 50 UG/ML
INJECTION, SOLUTION INTRAMUSCULAR; INTRAVENOUS
Status: DISPENSED
Start: 2019-04-24

## (undated) RX ORDER — CEFAZOLIN SODIUM 2 G/100ML
INJECTION, SOLUTION INTRAVENOUS
Status: DISPENSED
Start: 2019-04-24

## (undated) RX ORDER — LIDOCAINE HYDROCHLORIDE 20 MG/ML
INJECTION, SOLUTION EPIDURAL; INFILTRATION; INTRACAUDAL; PERINEURAL
Status: DISPENSED
Start: 2019-04-24

## (undated) RX ORDER — GINSENG 100 MG
CAPSULE ORAL
Status: DISPENSED
Start: 2019-04-24

## (undated) RX ORDER — BUPIVACAINE HYDROCHLORIDE AND EPINEPHRINE 5; 5 MG/ML; UG/ML
INJECTION, SOLUTION EPIDURAL; INTRACAUDAL; PERINEURAL
Status: DISPENSED
Start: 2019-04-24